# Patient Record
Sex: MALE | Race: WHITE | NOT HISPANIC OR LATINO | Employment: FULL TIME | URBAN - METROPOLITAN AREA
[De-identification: names, ages, dates, MRNs, and addresses within clinical notes are randomized per-mention and may not be internally consistent; named-entity substitution may affect disease eponyms.]

---

## 2023-12-26 DIAGNOSIS — E78.5 HYPERLIPIDEMIA, UNSPECIFIED HYPERLIPIDEMIA TYPE: Primary | ICD-10-CM

## 2023-12-26 RX ORDER — ATORVASTATIN CALCIUM 40 MG/1
40 TABLET, FILM COATED ORAL DAILY
Qty: 90 TABLET | Refills: 3 | Status: SHIPPED | OUTPATIENT
Start: 2023-12-26 | End: 2024-02-13 | Stop reason: SDUPTHER

## 2024-02-13 ENCOUNTER — LAB (OUTPATIENT)
Dept: LAB | Facility: LAB | Age: 34
End: 2024-02-13
Payer: COMMERCIAL

## 2024-02-13 ENCOUNTER — OFFICE VISIT (OUTPATIENT)
Dept: PRIMARY CARE | Facility: CLINIC | Age: 34
End: 2024-02-13
Payer: COMMERCIAL

## 2024-02-13 VITALS
HEART RATE: 57 BPM | HEIGHT: 72 IN | BODY MASS INDEX: 34.4 KG/M2 | RESPIRATION RATE: 16 BRPM | OXYGEN SATURATION: 98 % | SYSTOLIC BLOOD PRESSURE: 140 MMHG | WEIGHT: 254 LBS | DIASTOLIC BLOOD PRESSURE: 90 MMHG

## 2024-02-13 DIAGNOSIS — E78.5 HYPERLIPIDEMIA, UNSPECIFIED HYPERLIPIDEMIA TYPE: ICD-10-CM

## 2024-02-13 DIAGNOSIS — E55.9 VITAMIN D DEFICIENCY: ICD-10-CM

## 2024-02-13 DIAGNOSIS — Z98.890 H/O AORTIC VALVULOPLASTY: ICD-10-CM

## 2024-02-13 DIAGNOSIS — R73.01 ABNORMAL FASTING GLUCOSE: ICD-10-CM

## 2024-02-13 DIAGNOSIS — I10 ESSENTIAL (PRIMARY) HYPERTENSION: ICD-10-CM

## 2024-02-13 DIAGNOSIS — Z76.89 ENCOUNTER TO ESTABLISH CARE: Primary | ICD-10-CM

## 2024-02-13 DIAGNOSIS — Q23.1 BICUSPID AORTIC VALVE (HHS-HCC): ICD-10-CM

## 2024-02-13 DIAGNOSIS — R01.1 HEART MURMUR: ICD-10-CM

## 2024-02-13 DIAGNOSIS — K62.5 RECTAL BLEEDING: ICD-10-CM

## 2024-02-13 DIAGNOSIS — Q23.1 CONGENITAL INSUFFICIENCY OF AORTIC VALVE (HHS-HCC): ICD-10-CM

## 2024-02-13 LAB
25(OH)D3 SERPL-MCNC: 34 NG/ML (ref 30–100)
ALBUMIN SERPL BCP-MCNC: 4.8 G/DL (ref 3.4–5)
ALP SERPL-CCNC: 75 U/L (ref 33–120)
ALT SERPL W P-5'-P-CCNC: 46 U/L (ref 10–52)
ANION GAP SERPL CALC-SCNC: 14 MMOL/L (ref 10–20)
APPEARANCE UR: CLEAR
AST SERPL W P-5'-P-CCNC: 27 U/L (ref 9–39)
BASOPHILS # BLD AUTO: 0.05 X10*3/UL (ref 0–0.1)
BASOPHILS NFR BLD AUTO: 0.7 %
BILIRUB SERPL-MCNC: 0.6 MG/DL (ref 0–1.2)
BILIRUB UR STRIP.AUTO-MCNC: NEGATIVE MG/DL
BUN SERPL-MCNC: 10 MG/DL (ref 6–23)
CALCIUM SERPL-MCNC: 10 MG/DL (ref 8.6–10.6)
CHLORIDE SERPL-SCNC: 102 MMOL/L (ref 98–107)
CHOLEST SERPL-MCNC: 158 MG/DL (ref 0–199)
CHOLESTEROL/HDL RATIO: 4.1
CO2 SERPL-SCNC: 28 MMOL/L (ref 21–32)
COLOR UR: COLORLESS
CREAT SERPL-MCNC: 0.84 MG/DL (ref 0.5–1.3)
EGFRCR SERPLBLD CKD-EPI 2021: >90 ML/MIN/1.73M*2
EOSINOPHIL # BLD AUTO: 0.35 X10*3/UL (ref 0–0.7)
EOSINOPHIL NFR BLD AUTO: 5 %
ERYTHROCYTE [DISTWIDTH] IN BLOOD BY AUTOMATED COUNT: 12.3 % (ref 11.5–14.5)
EST. AVERAGE GLUCOSE BLD GHB EST-MCNC: 97 MG/DL
GLUCOSE SERPL-MCNC: 99 MG/DL (ref 74–99)
GLUCOSE UR STRIP.AUTO-MCNC: NORMAL MG/DL
HBA1C MFR BLD: 5 %
HCT VFR BLD AUTO: 44.7 % (ref 41–52)
HDLC SERPL-MCNC: 38.7 MG/DL
HGB BLD-MCNC: 15.5 G/DL (ref 13.5–17.5)
IMM GRANULOCYTES # BLD AUTO: 0.03 X10*3/UL (ref 0–0.7)
IMM GRANULOCYTES NFR BLD AUTO: 0.4 % (ref 0–0.9)
KETONES UR STRIP.AUTO-MCNC: NEGATIVE MG/DL
LDLC SERPL CALC-MCNC: 64 MG/DL
LEUKOCYTE ESTERASE UR QL STRIP.AUTO: NEGATIVE
LYMPHOCYTES # BLD AUTO: 2.08 X10*3/UL (ref 1.2–4.8)
LYMPHOCYTES NFR BLD AUTO: 29.4 %
MCH RBC QN AUTO: 29.4 PG (ref 26–34)
MCHC RBC AUTO-ENTMCNC: 34.7 G/DL (ref 32–36)
MCV RBC AUTO: 85 FL (ref 80–100)
MONOCYTES # BLD AUTO: 0.45 X10*3/UL (ref 0.1–1)
MONOCYTES NFR BLD AUTO: 6.4 %
NEUTROPHILS # BLD AUTO: 4.11 X10*3/UL (ref 1.2–7.7)
NEUTROPHILS NFR BLD AUTO: 58.1 %
NITRITE UR QL STRIP.AUTO: NEGATIVE
NON HDL CHOLESTEROL: 119 MG/DL (ref 0–149)
NRBC BLD-RTO: 0 /100 WBCS (ref 0–0)
PH UR STRIP.AUTO: 7 [PH]
PLATELET # BLD AUTO: 243 X10*3/UL (ref 150–450)
POTASSIUM SERPL-SCNC: 4.6 MMOL/L (ref 3.5–5.3)
PROT SERPL-MCNC: 7.7 G/DL (ref 6.4–8.2)
PROT UR STRIP.AUTO-MCNC: NEGATIVE MG/DL
RBC # BLD AUTO: 5.27 X10*6/UL (ref 4.5–5.9)
RBC # UR STRIP.AUTO: NEGATIVE /UL
SODIUM SERPL-SCNC: 139 MMOL/L (ref 136–145)
SP GR UR STRIP.AUTO: 1
TRIGL SERPL-MCNC: 275 MG/DL (ref 0–149)
TSH SERPL-ACNC: 2.13 MIU/L (ref 0.44–3.98)
UROBILINOGEN UR STRIP.AUTO-MCNC: NORMAL MG/DL
VLDL: 55 MG/DL (ref 0–40)
WBC # BLD AUTO: 7.1 X10*3/UL (ref 4.4–11.3)

## 2024-02-13 PROCEDURE — 3080F DIAST BP >= 90 MM HG: CPT | Performed by: STUDENT IN AN ORGANIZED HEALTH CARE EDUCATION/TRAINING PROGRAM

## 2024-02-13 PROCEDURE — 85025 COMPLETE CBC W/AUTO DIFF WBC: CPT

## 2024-02-13 PROCEDURE — 84443 ASSAY THYROID STIM HORMONE: CPT

## 2024-02-13 PROCEDURE — 83036 HEMOGLOBIN GLYCOSYLATED A1C: CPT

## 2024-02-13 PROCEDURE — 1036F TOBACCO NON-USER: CPT | Performed by: STUDENT IN AN ORGANIZED HEALTH CARE EDUCATION/TRAINING PROGRAM

## 2024-02-13 PROCEDURE — 80053 COMPREHEN METABOLIC PANEL: CPT

## 2024-02-13 PROCEDURE — 82306 VITAMIN D 25 HYDROXY: CPT

## 2024-02-13 PROCEDURE — 36415 COLL VENOUS BLD VENIPUNCTURE: CPT

## 2024-02-13 PROCEDURE — 80061 LIPID PANEL: CPT

## 2024-02-13 PROCEDURE — 3077F SYST BP >= 140 MM HG: CPT | Performed by: STUDENT IN AN ORGANIZED HEALTH CARE EDUCATION/TRAINING PROGRAM

## 2024-02-13 PROCEDURE — 99214 OFFICE O/P EST MOD 30 MIN: CPT | Performed by: STUDENT IN AN ORGANIZED HEALTH CARE EDUCATION/TRAINING PROGRAM

## 2024-02-13 PROCEDURE — 81003 URINALYSIS AUTO W/O SCOPE: CPT

## 2024-02-13 RX ORDER — ATORVASTATIN CALCIUM 40 MG/1
40 TABLET, FILM COATED ORAL DAILY
Qty: 90 TABLET | Refills: 1 | Status: SHIPPED | OUTPATIENT
Start: 2024-02-13

## 2024-02-13 RX ORDER — VALSARTAN 40 MG/1
1 TABLET ORAL DAILY
COMMUNITY
Start: 2021-06-01 | End: 2024-02-15

## 2024-02-13 RX ORDER — ASPIRIN 81 MG/1
1 TABLET ORAL DAILY
COMMUNITY

## 2024-02-13 RX ORDER — NEBIVOLOL 5 MG/1
1 TABLET ORAL DAILY
COMMUNITY
Start: 2021-02-23 | End: 2024-04-04

## 2024-02-13 RX ORDER — NIFEDIPINE 90 MG/1
1 TABLET, EXTENDED RELEASE ORAL DAILY
COMMUNITY
Start: 2023-05-30 | End: 2024-04-04

## 2024-02-13 RX ORDER — ACETAMINOPHEN 500 MG
TABLET ORAL
COMMUNITY

## 2024-02-13 ASSESSMENT — PATIENT HEALTH QUESTIONNAIRE - PHQ9
SUM OF ALL RESPONSES TO PHQ9 QUESTIONS 1 AND 2: 0
2. FEELING DOWN, DEPRESSED OR HOPELESS: NOT AT ALL
1. LITTLE INTEREST OR PLEASURE IN DOING THINGS: NOT AT ALL

## 2024-02-13 ASSESSMENT — ENCOUNTER SYMPTOMS
OCCASIONAL FEELINGS OF UNSTEADINESS: 0
LIGHT-HEADEDNESS: 0
NAUSEA: 0
ABDOMINAL PAIN: 0
FEVER: 0
DIARRHEA: 0
DEPRESSION: 0
FATIGUE: 0
CONSTIPATION: 0
LOSS OF SENSATION IN FEET: 0
FREQUENCY: 0
RHINORRHEA: 0
MYALGIAS: 0
VOMITING: 0
COUGH: 0
HEADACHES: 0
ARTHRALGIAS: 0
DIZZINESS: 0
SHORTNESS OF BREATH: 0
CHILLS: 0

## 2024-02-13 NOTE — ASSESSMENT & PLAN NOTE
Discovered in 2015 with heart murmur. Was seen by cardiology and found to be stable on echocardiogram. Repeated a year later and found to be grade 4 regurgitation. Aortic valvuloplasty performed in 2017.

## 2024-02-13 NOTE — ASSESSMENT & PLAN NOTE
Discovered in 2015 with heart murmur. Was seen by cardiology and found to be stable on echocardiogram. Repeated a year later and found to be grade 4 regurgitation. Aortic valvuloplasty performed in 2017.   aerobic capacity/endurance/gait, locomotion, and balance/muscle strength

## 2024-02-13 NOTE — ASSESSMENT & PLAN NOTE
Chronic. Stable. Managed by cardiology. Well controlled on valsartan and nebivolol and nifedipine.

## 2024-02-13 NOTE — PROGRESS NOTES
Subjective   Patient ID: Chato Ledesma is a 33 y.o. male who presents for new patient to establish care (Requesting blood work ).    HPI     He is here to establish care.  He had last been seen about a year ago with labs.  His last PCP had retired. He was at [facility].  He does need an order for lab work.  He does need a refill of his atorvastatin    He does follow regularly with Cardiology once yearly.  He follows with Dr. Mayberry.  He gets yearly echocardiograms through their office.  He gets his nifedipine, nebivolol and the valsartan from his cardiologist.    Had a colonoscopy about 2019 for having some episodes of rectal bleeding. They ended up discovering hemorrhoids.    He has had about 5-10 episodes over the last 5 years that starts with some visual symptoms. This then progresses to more of an aura.  He had been seeing his optometrist.     Review of Systems   Constitutional:  Negative for chills, fatigue and fever.   HENT:  Negative for congestion, postnasal drip and rhinorrhea.    Respiratory:  Negative for cough and shortness of breath.    Cardiovascular:  Negative for chest pain.   Gastrointestinal:  Negative for abdominal pain, constipation, diarrhea, nausea and vomiting.   Genitourinary:  Negative for frequency.   Musculoskeletal:  Negative for arthralgias and myalgias.   Neurological:  Negative for dizziness, light-headedness and headaches.       Objective   /90   Pulse 57   Resp 16   Ht 1.829 m (6')   Wt 115 kg (254 lb)   SpO2 98%   BMI 34.45 kg/m²     Physical Exam  Vitals and nursing note reviewed.   Constitutional:       General: He is not in acute distress.     Appearance: Normal appearance. He is normal weight. He is not ill-appearing or toxic-appearing.   HENT:      Head: Normocephalic and atraumatic.   Cardiovascular:      Rate and Rhythm: Normal rate and regular rhythm.      Heart sounds: Normal heart sounds.   Pulmonary:      Effort: Pulmonary effort is normal.       Breath sounds: Normal breath sounds.   Abdominal:      General: Bowel sounds are normal.      Palpations: Abdomen is soft.   Neurological:      Mental Status: He is alert.         Assessment/Plan   Problem List Items Addressed This Visit             ICD-10-CM    Congenital insufficiency of aortic valve Q23.1     Discovered in 2015 with heart murmur. Was seen by cardiology and found to be stable on echocardiogram. Repeated a year later and found to be grade 4 regurgitation. Aortic valvuloplasty performed in 2017.         Relevant Medications    NIFEdipine ER (NIFEdipine CC) 90 mg 24 hr tablet    nebivolol (Bystolic) 5 mg tablet    H/O aortic valvuloplasty Z98.890     Performed April 20th 2017. Follows with cardiology with  with Dr. Mayberry.         Bicuspid aortic valve Q23.1     Discovered in 2015 with heart murmur. Was seen by cardiology and found to be stable on echocardiogram. Repeated a year later and found to be grade 4 regurgitation. Aortic valvuloplasty performed in 2017.         Relevant Medications    NIFEdipine ER (NIFEdipine CC) 90 mg 24 hr tablet    nebivolol (Bystolic) 5 mg tablet    Essential (primary) hypertension I10     Chronic. Stable. Managed by cardiology. Well controlled on valsartan and nebivolol and nifedipine.         Relevant Orders    CBC and Auto Differential    Lipid Panel    Comprehensive Metabolic Panel    TSH with reflex to Free T4 if abnormal    Urinalysis with Reflex Microscopic    Vitamin D deficiency E55.9     Chronic. Stable. Well controlled on daily supplementation.         Relevant Orders    Vitamin D 25-Hydroxy,Total (for eval of Vitamin D levels)    Hyperlipidemia E78.5     Chronic. Stable. Well controlled on the atorvastatin.         Relevant Medications    atorvastatin (Lipitor) 40 mg tablet    Other Relevant Orders    CBC and Auto Differential    Lipid Panel    Comprehensive Metabolic Panel    TSH with reflex to Free T4 if abnormal    Heart murmur R01.1     Discovered in  2015 with heart murmur. Was seen by cardiology and found to be stable on echocardiogram. Repeated a year later and found to be grade 4 regurgitation. Aortic valvuloplasty performed in 2017.         Rectal bleeding K62.5     Occasionally about 1-2 times a month. Unchanging. Previous colonoscopy revealed hemorrhoids.         Abnormal fasting glucose R73.01    Relevant Orders    Hemoglobin A1C     Other Visit Diagnoses         Codes    Encounter to establish care    -  Primary Z76.89          History and physical examination as above.  Patient presenting to establish care.  Lab work orders placed for the patient to be done fasting as he does not recall the last time he has had this performed.  We will contact patient with the results and review in more detail at next appointment for wellness examination in the next couple of weeks.  Updated patient's medical history in the chart.  Recommended continuing to follow-up with his cardiologist.  Recommended to keep good track of blood pressures at home.  He will discuss with his cardiologist if it remains elevated to see if they want to make any adjustments to his medications.  Patient will come in sooner for other acute concerns or complaints.

## 2024-02-13 NOTE — PATIENT INSTRUCTIONS
Thank you for coming in to establish care with me today.    I went ahead and placed lab work orders for you. You can go downstairs today since you are fasting and get this performed.  If there is something more urgent on the lab work, we  we will give you a call first.  Otherwise we can follow-up in more detail at your next appointment for a wellness examination.  We can plan for wellness examination in the next couple of weeks to keep the appointment close note the lab work.  I went ahead and updated most of your medical history in the chart.  We can follow-up on new problems as they arise.  Please continue with regular follow-up with your cardiologist.  I do recommend keeping track of your blood pressures at least a couple times a week upcoming prior to your appointment.  It might be beneficial to have some of the medications adjusted.  We can always discuss this in the future if you would like.  I did send in a refill of the atorvastatin to your pharmacy.  Please call if you are having any issues with getting the medication delivered through the mail order.    Please call with any additional questions or concerns.    Thank you

## 2024-02-15 DIAGNOSIS — I10 ESSENTIAL (PRIMARY) HYPERTENSION: Primary | ICD-10-CM

## 2024-02-15 RX ORDER — VALSARTAN 40 MG/1
40 TABLET ORAL DAILY
Qty: 90 TABLET | Refills: 0 | Status: SHIPPED | OUTPATIENT
Start: 2024-02-15 | End: 2024-04-24 | Stop reason: WASHOUT

## 2024-03-05 ENCOUNTER — APPOINTMENT (OUTPATIENT)
Dept: PRIMARY CARE | Facility: CLINIC | Age: 34
End: 2024-03-05
Payer: COMMERCIAL

## 2024-03-26 ENCOUNTER — APPOINTMENT (OUTPATIENT)
Dept: CARDIOLOGY | Facility: CLINIC | Age: 34
End: 2024-03-26
Payer: COMMERCIAL

## 2024-04-03 DIAGNOSIS — I10 ESSENTIAL (PRIMARY) HYPERTENSION: Primary | ICD-10-CM

## 2024-04-04 RX ORDER — NEBIVOLOL 5 MG/1
5 TABLET ORAL DAILY
Qty: 90 TABLET | Refills: 0 | Status: SHIPPED | OUTPATIENT
Start: 2024-04-04

## 2024-04-04 RX ORDER — NIFEDIPINE 90 MG/1
90 TABLET, EXTENDED RELEASE ORAL DAILY
Qty: 90 TABLET | Refills: 0 | Status: SHIPPED | OUTPATIENT
Start: 2024-04-04

## 2024-04-16 ENCOUNTER — APPOINTMENT (OUTPATIENT)
Dept: CARDIOLOGY | Facility: CLINIC | Age: 34
End: 2024-04-16
Payer: COMMERCIAL

## 2024-04-24 ENCOUNTER — OFFICE VISIT (OUTPATIENT)
Dept: CARDIOLOGY | Facility: CLINIC | Age: 34
End: 2024-04-24
Payer: COMMERCIAL

## 2024-04-24 ENCOUNTER — HOSPITAL ENCOUNTER (OUTPATIENT)
Dept: CARDIOLOGY | Facility: CLINIC | Age: 34
Discharge: HOME | End: 2024-04-24
Payer: COMMERCIAL

## 2024-04-24 VITALS
SYSTOLIC BLOOD PRESSURE: 133 MMHG | WEIGHT: 255 LBS | HEART RATE: 66 BPM | DIASTOLIC BLOOD PRESSURE: 80 MMHG | HEIGHT: 72 IN | TEMPERATURE: 97.6 F | BODY MASS INDEX: 34.54 KG/M2

## 2024-04-24 DIAGNOSIS — Z98.890 STATUS POST AORTIC VALVE REPAIR: Primary | ICD-10-CM

## 2024-04-24 DIAGNOSIS — Q23.1 CONGENITAL INSUFFICIENCY OF AORTIC VALVE (HHS-HCC): ICD-10-CM

## 2024-04-24 DIAGNOSIS — I10 ESSENTIAL (PRIMARY) HYPERTENSION: ICD-10-CM

## 2024-04-24 DIAGNOSIS — E78.2 MIXED HYPERLIPIDEMIA: ICD-10-CM

## 2024-04-24 DIAGNOSIS — Z98.890 OTHER SPECIFIED POSTPROCEDURAL STATES: ICD-10-CM

## 2024-04-24 LAB
AORTIC VALVE MEAN GRADIENT: 16.1 MMHG
AORTIC VALVE PEAK VELOCITY: 2.68 M/S
AV PEAK GRADIENT: 28.8 MMHG
AVA (PEAK VEL): 3 CM2
AVA (VTI): 3.08 CM2
EJECTION FRACTION APICAL 4 CHAMBER: 71.9
LEFT ATRIUM VOLUME AREA LENGTH INDEX BSA: 30.1 ML/M2
LEFT VENTRICLE INTERNAL DIMENSION DIASTOLE: 5.3 CM (ref 3.5–6)
LEFT VENTRICULAR OUTFLOW TRACT DIAMETER: 3.26 CM
LV EJECTION FRACTION BIPLANE: 73 %
MITRAL VALVE E/A RATIO: 1.33
MITRAL VALVE E/E' RATIO: 6.24
RIGHT VENTRICLE FREE WALL PEAK S': 13 CM/S
TRICUSPID ANNULAR PLANE SYSTOLIC EXCURSION: 2.2 CM

## 2024-04-24 PROCEDURE — 3075F SYST BP GE 130 - 139MM HG: CPT | Performed by: INTERNAL MEDICINE

## 2024-04-24 PROCEDURE — 93306 TTE W/DOPPLER COMPLETE: CPT | Performed by: INTERNAL MEDICINE

## 2024-04-24 PROCEDURE — 99214 OFFICE O/P EST MOD 30 MIN: CPT | Performed by: INTERNAL MEDICINE

## 2024-04-24 PROCEDURE — 93306 TTE W/DOPPLER COMPLETE: CPT

## 2024-04-24 PROCEDURE — 1036F TOBACCO NON-USER: CPT | Performed by: INTERNAL MEDICINE

## 2024-04-24 PROCEDURE — 99214 OFFICE O/P EST MOD 30 MIN: CPT | Mod: 25 | Performed by: INTERNAL MEDICINE

## 2024-04-24 PROCEDURE — 3079F DIAST BP 80-89 MM HG: CPT | Performed by: INTERNAL MEDICINE

## 2024-04-24 RX ORDER — VALSARTAN 80 MG/1
80 TABLET ORAL DAILY
Qty: 90 TABLET | Refills: 3 | Status: SHIPPED | OUTPATIENT
Start: 2024-04-24 | End: 2025-04-24

## 2024-04-24 NOTE — PROGRESS NOTES
Chief Complaint:   Valve Disorder     History of Present Illness     Chato Ledesma is a 34 y.o. male presenting with aortic valve regurgitation s/p aortic valve repair.  The patient has been well since their last appointment and has no cardiac complaints.  The patient denies chest pain, shortness of breath, or any systemic symptoms.  The patient is compliant with aspirin and antibiotic prophylaxis to prevent endocarditis.  Their most recent echocardiogram demonstrates normal prosthetic valve function.      Review of Systems  All pertinent systems have been reviewed and are negative except for what is stated in the history of present illness.    All other systems have been reviewed and are negative and noncontributory to this patient's current ailments.  .       Previous History     Past Medical History:  He has a past medical history of Personal history of (corrected) congenital malformations of heart and circulatory system (04/26/2017), Personal history of diseases of the blood and blood-forming organs and certain disorders involving the immune mechanism, Personal history of other diseases of the circulatory system (04/26/2017), Personal history of other diseases of the digestive system (04/26/2017), Personal history of other diseases of the digestive system (04/26/2017), and Status post aortic valve repair (04/24/2024).    Past Surgical History:  He has a past surgical history that includes Other surgical history (04/26/2017).      Social History:  He reports that he has never smoked. He has never been exposed to tobacco smoke. He has never used smokeless tobacco. He reports current alcohol use of about 10.0 - 12.0 standard drinks of alcohol per week. He reports that he does not use drugs.    Family History:  Family History   Problem Relation Name Age of Onset    Hyperlipidemia Mother      Hypertension Mother      Other (bladder cancer) Father          Allergies:  Patient has no known allergies.    Outpatient  Medications:  Current Outpatient Medications   Medication Instructions    aspirin 81 mg EC tablet 1 tablet, oral, Daily    atorvastatin (LIPITOR) 40 mg, oral, Daily    cholecalciferol (Vitamin D-3) 5,000 Units tablet oral    nebivolol (BYSTOLIC) 5 mg, oral, Daily    NIFEdipine ER (ADALAT CC) 90 mg, oral, Daily    valsartan (DIOVAN) 40 mg, oral, Daily       Physical Examination   Vitals:  Visit Vitals  /80 (BP Location: Right arm)   Pulse 66   Temp 36.4 °C (97.6 °F)   Ht 1.829 m (6')   Wt 116 kg (255 lb)   BMI 34.58 kg/m²   Smoking Status Never   BSA 2.43 m²    Physical Exam  Vitals reviewed.   Constitutional:       General: He is not in acute distress.     Appearance: Normal appearance.   HENT:      Head: Normocephalic and atraumatic.      Nose: Nose normal.   Eyes:      Conjunctiva/sclera: Conjunctivae normal.   Cardiovascular:      Rate and Rhythm: Normal rate and regular rhythm.      Pulses: Normal pulses.      Heart sounds: Murmur heard.      Systolic murmur is present with a grade of 2/6.   Pulmonary:      Effort: Pulmonary effort is normal. No respiratory distress.      Breath sounds: Normal breath sounds. No wheezing, rhonchi or rales.   Abdominal:      General: Bowel sounds are normal. There is no distension.      Palpations: Abdomen is soft.      Tenderness: There is no abdominal tenderness.   Musculoskeletal:         General: No swelling.      Right lower leg: No edema.      Left lower leg: No edema.   Skin:     General: Skin is warm and dry.      Capillary Refill: Capillary refill takes less than 2 seconds.   Neurological:      General: No focal deficit present.      Mental Status: He is alert.   Psychiatric:         Mood and Affect: Mood normal.              Labs/Imaging/Cardiac Studies     Last Labs:  CBC -  Lab Results   Component Value Date    WBC 7.1 02/13/2024    HGB 15.5 02/13/2024    HCT 44.7 02/13/2024    MCV 85 02/13/2024     02/13/2024       CMP -  Lab Results   Component Value  Date    CALCIUM 10.0 02/13/2024    PROT 7.7 02/13/2024    ALBUMIN 4.8 02/13/2024    AST 27 02/13/2024    ALT 46 02/13/2024    ALKPHOS 75 02/13/2024    BILITOT 0.6 02/13/2024       LIPID PANEL -   Lab Results   Component Value Date    CHOL 158 02/13/2024    HDL 38.7 02/13/2024    CHHDL 4.1 02/13/2024    VLDL 55 (H) 02/13/2024    TRIG 275 (H) 02/13/2024    NHDL 119 02/13/2024       RENAL FUNCTION PANEL -   Lab Results   Component Value Date    K 4.6 02/13/2024       Lab Results   Component Value Date    HGBA1C 5.0 02/13/2024       ECG:    Echo:  No echocardiogram results found for the past 12 months       Assessment and Recommendations     Assessment/Plan     1. Status post aortic valve repair  Stable and asymptomatic s/p AV repair with mild stenois.  Echocardiogram today shows normal LV function and trivial AI with mild stenosis.  Will continue treatment with ASA 81 mg every day and lifelong SBE antibiotic prophylaxis.     - Transthoracic echo (TTE) complete; Future  - Follow Up In Cardiology; Future    2. Mixed hyperlipidemia  The patient's lipids are well controlled on chronic statin therapy and they are meeting their goal LDL cholesterol per the ACC/AHA guidelines.      - Transthoracic echo (TTE) complete; Future  - Follow Up In Cardiology; Future    3. Essential (primary) hypertension  Increase Valsartan for recently elevated BP  - Transthoracic echo (TTE) complete; Future  - Follow Up In Cardiology; Future     Echo and OV 1 year    Andrés Mayberry MD    Exclusive of any other services or procedures performed, I, Andrés Mayberry MD , spent 30 minutes in duration for this visit today.  This time consisted of chart review, obtaining history, and/or performing the exam as documented above as well as documenting the clinical information for the encounter in the electronic record, discussing treatment options, plans, and/or goals with patient, family, and/or caregiver, refilling medications, updating the electronic  record, ordering medicines, lab work, imaging, referrals, and/or procedures as documented above and communicating with other [facility] professionals. I have discussed the results of laboratory, radiology, and cardiology studies with the patient and their family/caregiver.

## 2024-06-27 DIAGNOSIS — I10 ESSENTIAL (PRIMARY) HYPERTENSION: ICD-10-CM

## 2024-07-01 DIAGNOSIS — E78.5 HYPERLIPIDEMIA, UNSPECIFIED HYPERLIPIDEMIA TYPE: ICD-10-CM

## 2024-07-01 RX ORDER — NEBIVOLOL 5 MG/1
5 TABLET ORAL DAILY
Qty: 90 TABLET | Refills: 2 | Status: SHIPPED | OUTPATIENT
Start: 2024-07-01

## 2024-07-01 RX ORDER — NIFEDIPINE 90 MG/1
90 TABLET, EXTENDED RELEASE ORAL DAILY
Qty: 90 TABLET | Refills: 2 | Status: SHIPPED | OUTPATIENT
Start: 2024-07-01

## 2024-07-03 RX ORDER — ATORVASTATIN CALCIUM 40 MG/1
40 TABLET, FILM COATED ORAL DAILY
Qty: 90 TABLET | Refills: 1 | Status: SHIPPED | OUTPATIENT
Start: 2024-07-03

## 2024-10-29 ENCOUNTER — APPOINTMENT (OUTPATIENT)
Dept: PRIMARY CARE | Facility: CLINIC | Age: 34
End: 2024-10-29
Payer: COMMERCIAL

## 2024-10-29 VITALS
BODY MASS INDEX: 33.63 KG/M2 | WEIGHT: 248 LBS | OXYGEN SATURATION: 98 % | SYSTOLIC BLOOD PRESSURE: 142 MMHG | TEMPERATURE: 97.1 F | HEART RATE: 51 BPM | DIASTOLIC BLOOD PRESSURE: 86 MMHG

## 2024-10-29 DIAGNOSIS — Z98.890 H/O AORTIC VALVULOPLASTY: ICD-10-CM

## 2024-10-29 DIAGNOSIS — Z80.52 FAMILY HISTORY OF BLADDER CANCER: ICD-10-CM

## 2024-10-29 DIAGNOSIS — Q23.81 BICUSPID AORTIC VALVE: ICD-10-CM

## 2024-10-29 DIAGNOSIS — Z98.890 STATUS POST AORTIC VALVE REPAIR: ICD-10-CM

## 2024-10-29 DIAGNOSIS — E78.2 MIXED HYPERLIPIDEMIA: ICD-10-CM

## 2024-10-29 DIAGNOSIS — R73.01 ELEVATED FASTING GLUCOSE: ICD-10-CM

## 2024-10-29 DIAGNOSIS — Q23.1 CONGENITAL INSUFFICIENCY OF AORTIC VALVE (HHS-HCC): ICD-10-CM

## 2024-10-29 DIAGNOSIS — I10 ESSENTIAL (PRIMARY) HYPERTENSION: ICD-10-CM

## 2024-10-29 DIAGNOSIS — Z00.00 ENCOUNTER FOR ADULT WELLNESS VISIT: Primary | ICD-10-CM

## 2024-10-29 DIAGNOSIS — L98.9 CHANGING SKIN LESION: ICD-10-CM

## 2024-10-29 PROBLEM — E55.9 VITAMIN D DEFICIENCY: Status: RESOLVED | Noted: 2024-02-13 | Resolved: 2024-10-29

## 2024-10-29 PROCEDURE — 3077F SYST BP >= 140 MM HG: CPT | Performed by: STUDENT IN AN ORGANIZED HEALTH CARE EDUCATION/TRAINING PROGRAM

## 2024-10-29 PROCEDURE — 99395 PREV VISIT EST AGE 18-39: CPT | Performed by: STUDENT IN AN ORGANIZED HEALTH CARE EDUCATION/TRAINING PROGRAM

## 2024-10-29 PROCEDURE — 3079F DIAST BP 80-89 MM HG: CPT | Performed by: STUDENT IN AN ORGANIZED HEALTH CARE EDUCATION/TRAINING PROGRAM

## 2024-10-29 ASSESSMENT — PATIENT HEALTH QUESTIONNAIRE - PHQ9
1. LITTLE INTEREST OR PLEASURE IN DOING THINGS: NOT AT ALL
2. FEELING DOWN, DEPRESSED OR HOPELESS: NOT AT ALL
SUM OF ALL RESPONSES TO PHQ9 QUESTIONS 1 AND 2: 0

## 2024-10-29 ASSESSMENT — ENCOUNTER SYMPTOMS
ARTHRALGIAS: 0
LIGHT-HEADEDNESS: 0
COUGH: 0
SORE THROAT: 0
NUMBNESS: 0
NAUSEA: 0
ABDOMINAL PAIN: 0
FEVER: 0
RHINORRHEA: 0
FATIGUE: 0
DIZZINESS: 0
NERVOUS/ANXIOUS: 0
DYSURIA: 0
FREQUENCY: 0
COLOR CHANGE: 0
VOMITING: 0
MYALGIAS: 0
CHILLS: 0
SHORTNESS OF BREATH: 0
DYSPHORIC MOOD: 0
PALPITATIONS: 0
CONSTIPATION: 0
DIARRHEA: 0

## 2025-01-15 DIAGNOSIS — E78.5 HYPERLIPIDEMIA, UNSPECIFIED HYPERLIPIDEMIA TYPE: ICD-10-CM

## 2025-01-17 RX ORDER — ATORVASTATIN CALCIUM 40 MG/1
40 TABLET, FILM COATED ORAL DAILY
Qty: 90 TABLET | Refills: 1 | Status: SHIPPED | OUTPATIENT
Start: 2025-01-17

## 2025-02-15 DIAGNOSIS — I10 ESSENTIAL (PRIMARY) HYPERTENSION: ICD-10-CM

## 2025-02-17 RX ORDER — NIFEDIPINE 90 MG/1
90 TABLET, EXTENDED RELEASE ORAL DAILY
Qty: 90 TABLET | Refills: 0 | Status: SHIPPED | OUTPATIENT
Start: 2025-02-17

## 2025-02-17 RX ORDER — NEBIVOLOL 5 MG/1
5 TABLET ORAL DAILY
Qty: 90 TABLET | Refills: 0 | Status: SHIPPED | OUTPATIENT
Start: 2025-02-17

## 2025-02-26 ENCOUNTER — APPOINTMENT (OUTPATIENT)
Dept: DERMATOLOGY | Facility: CLINIC | Age: 35
End: 2025-02-26
Payer: COMMERCIAL

## 2025-02-26 DIAGNOSIS — L82.1 SEBORRHEIC KERATOSIS: Primary | ICD-10-CM

## 2025-02-26 DIAGNOSIS — Z12.83 ENCOUNTER FOR SCREENING FOR MALIGNANT NEOPLASM OF SKIN: ICD-10-CM

## 2025-02-26 PROCEDURE — 99203 OFFICE O/P NEW LOW 30 MIN: CPT | Performed by: NURSE PRACTITIONER

## 2025-02-26 PROCEDURE — 1036F TOBACCO NON-USER: CPT | Performed by: NURSE PRACTITIONER

## 2025-02-26 RX ORDER — AMOXICILLIN 500 MG/1
CAPSULE ORAL
COMMUNITY
Start: 2025-02-24

## 2025-02-26 NOTE — LETTER
February 26, 2025     Wallace Vasquez DO  [address]  [facility]  [place] [address]    Patient: Chato Ledesma   YOB: 1990   Date of Visit: 2/26/2025       Dear Dr. Wallace Vasquez DO:    Thank you for referring Chato Ledesma to me for evaluation. Below are my notes for this consultation.  If you have questions, please do not hesitate to call me. I look forward to following your patient along with you.       Sincerely,     Susan L Mayne, APRN-CNP      CC: No Recipients  ______________________________________________________________________________________    Subjective    Chato Ledesma is a 34 y.o. male who presents for the following: Suspicious Skin Lesion (Pt presents to office for evaluation of lesion on the abdomen.  Pt states he thinks lesion has grown over the past year. No personal or family history of skin cancer noted. ).     Review of Systems:  No other skin or systemic complaints other than what is documented elsewhere in the note.    The following portions of the chart were reviewed this encounter and updated as appropriate:       Skin Cancer History  No skin cancer on file.    Specialty Problems    None    Past Medical History:  Chato Ledesma  has a past medical history of Personal history of (corrected) congenital malformations of heart and circulatory system (04/26/2017), Personal history of diseases of the blood and blood-forming organs and certain disorders involving the immune mechanism, Personal history of other diseases of the circulatory system (04/26/2017), Personal history of other diseases of the digestive system (04/26/2017), Personal history of other diseases of the digestive system (04/26/2017), Status post aortic valve repair (04/24/2024), and Vitamin D deficiency (02/13/2024).    Past Surgical History:  Chato Ledesma  has a past surgical history that includes Other surgical history (04/26/2017).    Family History:  Patient family history  includes Hyperlipidemia in his mother; Hypertension in his mother; bladder cancer in his father.    Social History:  Chato Ledesma  reports that he has never smoked. He has never been exposed to tobacco smoke. He has never used smokeless tobacco. He reports current alcohol use of about 10.0 - 12.0 standard drinks of alcohol per week. He reports that he does not use drugs.    Allergies:  Patient has no known allergies.    Current Medications / CAM's:    Current Outpatient Medications:   •  amoxicillin (Amoxil) 500 mg capsule, , Disp: , Rfl:   •  aspirin 81 mg EC tablet, Take 1 tablet (81 mg) by mouth once daily., Disp: , Rfl:   •  atorvastatin (Lipitor) 40 mg tablet, TAKE 1 TABLET BY MOUTH ONCE  DAILY, Disp: 90 tablet, Rfl: 1  •  cholecalciferol (Vitamin D-3) 5,000 Units tablet, Take by mouth., Disp: , Rfl:   •  nebivolol (Bystolic) 5 mg tablet, TAKE 1 TABLET BY MOUTH DAILY, Disp: 90 tablet, Rfl: 0  •  NIFEdipine XL 90 mg 24 hr tablet, TAKE 1 TABLET BY MOUTH DAILY, Disp: 90 tablet, Rfl: 0  •  valsartan (Diovan) 80 mg tablet, Take 1 tablet (80 mg) by mouth once daily., Disp: 90 tablet, Rfl: 3     Objective  Well appearing patient in no apparent distress; mood and affect are within normal limits.    A focused skin examination was performed. All findings within normal limits unless otherwise noted below.    Assessment/Plan  1. Seborrheic keratosis  Left Abdomen (side) - Upper  Stuck on verrucous, tan-brown papule    Although Seborrheic Keratoses can be troublesome and unsightly, they are entirely benign.  Removal of Seborrheic Keratoses is considered a cosmetic procedure. Removal is typically performed using liquid nitrogen cryotherapy.  Treatment of current lesions does not prevent the development of new Seborrheic Keratoses in the future.    2. Encounter for screening for malignant neoplasm of skin  neck, face, abdomen  Scattered benign lesions    - Protective measures, such as avoiding skin exposure to sunlight  during peak sun hours (10 AM to 3 PM), wearing protective clothing, and applying high-SPF sunscreen, are essential for reducing exposure to harmful ultraviolet (UV) light.  - Monthly self-examination of the skin is helpful to detect new lesions or changes in existing lesions.  - Discussed signs and symptoms of sun-related skin cancers.   - Make sure your moles are not signs of skin cancer (melanoma). Remember the ABCDEs of melanoma lesions:  A - Asymmetry: One half of the lesion does not mirror the other half.  B - Border: The borders are irregular or vague (indistinct).  C - Color: More than one color may be noted within the mole.  D - Diameter: Size greater than 6 mm (roughly the size of a pencil eraser) may be concerning.  E - Evolving: Notable changes in the lesion over time are suspicious signs for skin cancer.    Please call me if there are any changes or development of concerning symptoms (lesion/skin condition is changing, bleeding, enlarging, or worsening).

## 2025-02-26 NOTE — PROGRESS NOTES
Subjective     Chato Ledesma is a 34 y.o. male who presents for the following: Suspicious Skin Lesion (Pt presents to office for evaluation of lesion on the abdomen.  Pt states he thinks lesion has grown over the past year. No personal or family history of skin cancer noted. ).     Review of Systems:  No other skin or systemic complaints other than what is documented elsewhere in the note.    The following portions of the chart were reviewed this encounter and updated as appropriate:       Skin Cancer History  No skin cancer on file.    Specialty Problems    None    Past Medical History:  Chato Ledesma  has a past medical history of Personal history of (corrected) congenital malformations of heart and circulatory system (04/26/2017), Personal history of diseases of the blood and blood-forming organs and certain disorders involving the immune mechanism, Personal history of other diseases of the circulatory system (04/26/2017), Personal history of other diseases of the digestive system (04/26/2017), Personal history of other diseases of the digestive system (04/26/2017), Status post aortic valve repair (04/24/2024), and Vitamin D deficiency (02/13/2024).    Past Surgical History:  Chato Ledesma  has a past surgical history that includes Other surgical history (04/26/2017).    Family History:  Patient family history includes Hyperlipidemia in his mother; Hypertension in his mother; bladder cancer in his father.    Social History:  Chato Ledesma  reports that he has never smoked. He has never been exposed to tobacco smoke. He has never used smokeless tobacco. He reports current alcohol use of about 10.0 - 12.0 standard drinks of alcohol per week. He reports that he does not use drugs.    Allergies:  Patient has no known allergies.    Current Medications / CAM's:    Current Outpatient Medications:     amoxicillin (Amoxil) 500 mg capsule, , Disp: , Rfl:     aspirin 81 mg EC tablet, Take 1 tablet (81 mg) by mouth once  daily., Disp: , Rfl:     atorvastatin (Lipitor) 40 mg tablet, TAKE 1 TABLET BY MOUTH ONCE  DAILY, Disp: 90 tablet, Rfl: 1    cholecalciferol (Vitamin D-3) 5,000 Units tablet, Take by mouth., Disp: , Rfl:     nebivolol (Bystolic) 5 mg tablet, TAKE 1 TABLET BY MOUTH DAILY, Disp: 90 tablet, Rfl: 0    NIFEdipine XL 90 mg 24 hr tablet, TAKE 1 TABLET BY MOUTH DAILY, Disp: 90 tablet, Rfl: 0    valsartan (Diovan) 80 mg tablet, Take 1 tablet (80 mg) by mouth once daily., Disp: 90 tablet, Rfl: 3     Objective   Well appearing patient in no apparent distress; mood and affect are within normal limits.    A focused skin examination was performed. All findings within normal limits unless otherwise noted below.    Assessment/Plan   1. Seborrheic keratosis  Left Abdomen (side) - Upper  Stuck on verrucous, tan-brown papule    Although Seborrheic Keratoses can be troublesome and unsightly, they are entirely benign.  Removal of Seborrheic Keratoses is considered a cosmetic procedure. Removal is typically performed using liquid nitrogen cryotherapy.  Treatment of current lesions does not prevent the development of new Seborrheic Keratoses in the future.    2. Encounter for screening for malignant neoplasm of skin  neck, face, abdomen  Scattered benign lesions    - Protective measures, such as avoiding skin exposure to sunlight during peak sun hours (10 AM to 3 PM), wearing protective clothing, and applying high-SPF sunscreen, are essential for reducing exposure to harmful ultraviolet (UV) light.  - Monthly self-examination of the skin is helpful to detect new lesions or changes in existing lesions.  - Discussed signs and symptoms of sun-related skin cancers.   - Make sure your moles are not signs of skin cancer (melanoma). Remember the ABCDEs of melanoma lesions:  A - Asymmetry: One half of the lesion does not mirror the other half.  B - Border: The borders are irregular or vague (indistinct).  C - Color: More than one color may be  noted within the mole.  D - Diameter: Size greater than 6 mm (roughly the size of a pencil eraser) may be concerning.  E - Evolving: Notable changes in the lesion over time are suspicious signs for skin cancer.    Please call me if there are any changes or development of concerning symptoms (lesion/skin condition is changing, bleeding, enlarging, or worsening).

## 2025-04-07 DIAGNOSIS — E78.2 MIXED HYPERLIPIDEMIA: ICD-10-CM

## 2025-04-07 DIAGNOSIS — Z98.890 STATUS POST AORTIC VALVE REPAIR: ICD-10-CM

## 2025-04-07 DIAGNOSIS — I10 ESSENTIAL (PRIMARY) HYPERTENSION: ICD-10-CM

## 2025-04-08 RX ORDER — VALSARTAN 80 MG/1
80 TABLET ORAL DAILY
Qty: 90 TABLET | Refills: 0 | Status: SHIPPED | OUTPATIENT
Start: 2025-04-08

## 2025-04-19 DIAGNOSIS — I10 ESSENTIAL (PRIMARY) HYPERTENSION: ICD-10-CM

## 2025-04-21 RX ORDER — NEBIVOLOL 5 MG/1
5 TABLET ORAL DAILY
Qty: 90 TABLET | Refills: 0 | Status: SHIPPED | OUTPATIENT
Start: 2025-04-21

## 2025-04-21 RX ORDER — NIFEDIPINE 90 MG/1
90 TABLET, EXTENDED RELEASE ORAL DAILY
Qty: 90 TABLET | Refills: 0 | Status: SHIPPED | OUTPATIENT
Start: 2025-04-21

## 2025-04-29 ENCOUNTER — OFFICE VISIT (OUTPATIENT)
Dept: CARDIOLOGY | Facility: CLINIC | Age: 35
End: 2025-04-29
Payer: COMMERCIAL

## 2025-04-29 ENCOUNTER — HOSPITAL ENCOUNTER (OUTPATIENT)
Dept: CARDIOLOGY | Facility: CLINIC | Age: 35
Discharge: HOME | End: 2025-04-29
Payer: COMMERCIAL

## 2025-04-29 VITALS
DIASTOLIC BLOOD PRESSURE: 82 MMHG | HEART RATE: 65 BPM | WEIGHT: 250 LBS | HEIGHT: 72 IN | BODY MASS INDEX: 33.86 KG/M2 | SYSTOLIC BLOOD PRESSURE: 153 MMHG

## 2025-04-29 DIAGNOSIS — E78.2 MIXED HYPERLIPIDEMIA: ICD-10-CM

## 2025-04-29 DIAGNOSIS — Z98.890 STATUS POST AORTIC VALVE REPAIR: ICD-10-CM

## 2025-04-29 DIAGNOSIS — I10 ESSENTIAL (PRIMARY) HYPERTENSION: ICD-10-CM

## 2025-04-29 DIAGNOSIS — Q23.0 CONGENITAL STENOSIS OF AORTIC VALVE (HHS-HCC): ICD-10-CM

## 2025-04-29 LAB
AORTIC VALVE MEAN GRADIENT: 12 MMHG
AORTIC VALVE PEAK VELOCITY: 2.45 M/S
AV PEAK GRADIENT: 24 MMHG
AVA (PEAK VEL): 2.82 CM2
AVA (VTI): 3.32 CM2
EJECTION FRACTION APICAL 4 CHAMBER: 74.1
EJECTION FRACTION: 66 %
LEFT ATRIUM VOLUME AREA LENGTH INDEX BSA: 32.1 ML/M2
LEFT VENTRICLE INTERNAL DIMENSION DIASTOLE: 5.68 CM (ref 3.5–6)
LEFT VENTRICULAR OUTFLOW TRACT DIAMETER: 3.15 CM
MITRAL VALVE E/A RATIO: 1.38
RIGHT VENTRICLE FREE WALL PEAK S': 13 CM/S
TRICUSPID ANNULAR PLANE SYSTOLIC EXCURSION: 2.4 CM

## 2025-04-29 PROCEDURE — 93306 TTE W/DOPPLER COMPLETE: CPT | Performed by: INTERNAL MEDICINE

## 2025-04-29 PROCEDURE — 93306 TTE W/DOPPLER COMPLETE: CPT

## 2025-04-29 PROCEDURE — 3008F BODY MASS INDEX DOCD: CPT | Performed by: INTERNAL MEDICINE

## 2025-04-29 PROCEDURE — 3077F SYST BP >= 140 MM HG: CPT | Performed by: INTERNAL MEDICINE

## 2025-04-29 PROCEDURE — 3079F DIAST BP 80-89 MM HG: CPT | Performed by: INTERNAL MEDICINE

## 2025-04-29 PROCEDURE — 99214 OFFICE O/P EST MOD 30 MIN: CPT | Performed by: INTERNAL MEDICINE

## 2025-04-29 PROCEDURE — 99212 OFFICE O/P EST SF 10 MIN: CPT | Mod: 25 | Performed by: INTERNAL MEDICINE

## 2025-04-29 PROCEDURE — 1036F TOBACCO NON-USER: CPT | Performed by: INTERNAL MEDICINE

## 2025-04-29 RX ORDER — VALSARTAN 160 MG/1
160 TABLET ORAL DAILY
Qty: 30 TABLET | Refills: 11 | Status: SHIPPED | OUTPATIENT
Start: 2025-04-29 | End: 2026-04-29

## 2025-04-29 NOTE — PROGRESS NOTES
Chief Complaint:   Valve Disorder     History of Present Illness     Chaot Ledesma is a 35 y.o. male presenting with aortic valve regurgitation due to bicuspid aortic valve s/p aortic valve repair 4/20/17.  The patient has been well since their last appointment and has no cardiac complaints.  The patient denies chest pain, shortness of breath, or any systemic symptoms.  The patient is compliant with aspirin and antibiotic prophylaxis to prevent endocarditis.  Their most recent echocardiogram demonstrates normal mil aortic stenosis and trivial AI.  Exercising.    Review of Systems  All pertinent systems have been reviewed and are negative except for what is stated in the history of present illness.    All other systems have been reviewed and are negative and noncontributory to this patient's current ailments.  .       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    Past Surgical History:  He has a past surgical history that includes Other surgical history (04/26/2017).      Social History:  He reports that he has never smoked. He has never been exposed to tobacco smoke. He has never used smokeless tobacco. He reports current alcohol use of about 10.0 - 12.0 standard drinks of alcohol per week. He reports that he does not use drugs.    Family History:  Family History   Problem Relation Name Age of Onset    Hyperlipidemia Mother      Hypertension Mother      Other  (bladder cancer) Father          Allergies:  Patient has no known allergies.    Outpatient Medications:  Current Outpatient Medications   Medication Instructions    amoxicillin (Amoxil) 500 mg capsule     aspirin 81 mg EC tablet 1 tablet, Daily    atorvastatin (LIPITOR) 40 mg, oral, Daily    cholecalciferol (Vitamin D-3) 5,000 Units tablet Take by mouth.    nebivolol (BYSTOLIC) 5 mg, oral, Daily    NIFEdipine ER (ADALAT CC) 90 mg, oral, Daily    valsartan (DIOVAN) 80 mg, oral, Daily       Physical Examination   Vitals:  Visit Vitals  /82   Pulse 65   Ht 1.829 m (6')   Wt 113 kg (250 lb)   BMI 33.91 kg/m²   Smoking Status Never   BSA 2.4 m²    Physical Exam  Vitals reviewed.   Constitutional:       General: He is not in acute distress.     Appearance: Normal appearance.   HENT:      Head: Normocephalic and atraumatic.      Nose: Nose normal.   Eyes:      Conjunctiva/sclera: Conjunctivae normal.   Cardiovascular:      Rate and Rhythm: Normal rate and regular rhythm.      Pulses: Normal pulses.      Heart sounds: Murmur heard.      Systolic murmur is present with a grade of 1/6.   Pulmonary:      Effort: Pulmonary effort is normal. No respiratory distress.      Breath sounds: Normal breath sounds. No wheezing, rhonchi or rales.   Abdominal:      General: Bowel sounds are normal. There is no distension.      Palpations: Abdomen is soft.      Tenderness: There is no abdominal tenderness.   Musculoskeletal:         General: No swelling.      Right lower leg: No edema.      Left lower leg: No edema.   Skin:     General: Skin is warm and dry.      Capillary Refill: Capillary refill takes less than 2 seconds.   Neurological:      General: No focal deficit present.      Mental Status: He is alert.   Psychiatric:         Mood and Affect: Mood normal.              Labs/Imaging/Cardiac Studies     Last Labs:  CBC -  Lab Results   Component Value Date    WBC 7.1 02/13/2024    HGB 15.5 02/13/2024    HCT 44.7 02/13/2024     MCV 85 02/13/2024     02/13/2024       CMP -  Lab Results   Component Value Date    CALCIUM 10.0 02/13/2024    PROT 7.7 02/13/2024    ALBUMIN 4.8 02/13/2024    AST 27 02/13/2024    ALT 46 02/13/2024    ALKPHOS 75 02/13/2024    BILITOT 0.6 02/13/2024       LIPID PANEL -   Lab Results   Component Value Date    CHOL 158 02/13/2024    HDL 38.7 02/13/2024    CHHDL 4.1 02/13/2024    VLDL 55 (H) 02/13/2024    TRIG 275 (H) 02/13/2024    NHDL 119 02/13/2024       RENAL FUNCTION PANEL -   Lab Results   Component Value Date    K 4.6 02/13/2024       Lab Results   Component Value Date    HGBA1C 5.0 02/13/2024       Reviewed Echo, Labs and PCP notes    Echo:  No echocardiogram results found for the past 12 months       Assessment and Recommendations     Assessment/Plan     1. Status post aortic valve repair  Stable and asymptomatic s/p AV repair with mild stenois.  Echocardiogram today shows normal LV function and trivial AI with mild stenosis.  Will continue treatment with ASA 81 mg every day and lifelong SBE antibiotic prophylaxis.     - Transthoracic echo (TTE) complete; Future  - Follow Up In Cardiology; Future    2. Mixed hyperlipidemia  The patient's lipids are well controlled on chronic atorvastatin therapy and they are meeting their goal LDL cholesterol per the ACC/AHA guidelines.      - Transthoracic echo (TTE) complete; Future  - Follow Up In Cardiology; Future    3. Essential (primary) hypertension  Elevated today and at home.  Increase ARB to 160 mg every day.  Follow BP at home.  - Transthoracic echo (TTE) complete; Future  - Follow Up In Cardiology; Future     Chato Ledesma will return in 1 year for an office visit with Echo.       Andrés Mayberry MD    Exclusive of any other services or procedures performed, I, Andrés Mayberry MD , spent 30 minutes in duration for this visit today.  This time consisted of chart review, obtaining history, and/or performing the exam as documented above as well as documenting the  clinical information for the encounter in the electronic record, discussing treatment options, plans, and/or goals with patient, family, and/or caregiver, refilling medications, updating the electronic record, ordering medicines, lab work, imaging, referrals, and/or procedures as documented above and communicating with other [facility] professionals. I have discussed the results of laboratory, radiology, and cardiology studies with the patient and their family/caregiver.

## 2025-05-08 LAB
25(OH)D3+25(OH)D2 SERPL-MCNC: 27 NG/ML (ref 30–100)
ALBUMIN SERPL-MCNC: 4.9 G/DL (ref 3.6–5.1)
ALP SERPL-CCNC: 75 U/L (ref 36–130)
ALT SERPL-CCNC: 40 U/L (ref 9–46)
ANION GAP SERPL CALCULATED.4IONS-SCNC: 10 MMOL/L (CALC) (ref 7–17)
AST SERPL-CCNC: 29 U/L (ref 10–40)
BASOPHILS # BLD AUTO: 48 CELLS/UL (ref 0–200)
BASOPHILS NFR BLD AUTO: 0.7 %
BILIRUB SERPL-MCNC: 0.6 MG/DL (ref 0.2–1.2)
BUN SERPL-MCNC: 14 MG/DL (ref 7–25)
CALCIUM SERPL-MCNC: 9.3 MG/DL (ref 8.6–10.3)
CHLORIDE SERPL-SCNC: 103 MMOL/L (ref 98–110)
CHOLEST SERPL-MCNC: 151 MG/DL
CHOLEST/HDLC SERPL: 4.4 (CALC)
CO2 SERPL-SCNC: 23 MMOL/L (ref 20–32)
CREAT SERPL-MCNC: 0.93 MG/DL (ref 0.6–1.26)
EGFRCR SERPLBLD CKD-EPI 2021: 108 ML/MIN/1.73M2
EOSINOPHIL # BLD AUTO: 442 CELLS/UL (ref 15–500)
EOSINOPHIL NFR BLD AUTO: 6.4 %
ERYTHROCYTE [DISTWIDTH] IN BLOOD BY AUTOMATED COUNT: 12.8 % (ref 11–15)
EST. AVERAGE GLUCOSE BLD GHB EST-MCNC: 111 MG/DL
EST. AVERAGE GLUCOSE BLD GHB EST-SCNC: 6.2 MMOL/L
GLUCOSE SERPL-MCNC: 95 MG/DL (ref 65–99)
HBA1C MFR BLD: 5.5 %
HCT VFR BLD AUTO: 46 % (ref 38.5–50)
HDLC SERPL-MCNC: 34 MG/DL
HGB BLD-MCNC: 15.2 G/DL (ref 13.2–17.1)
LDLC SERPL CALC-MCNC: 79 MG/DL (CALC)
LYMPHOCYTES # BLD AUTO: 2111 CELLS/UL (ref 850–3900)
LYMPHOCYTES NFR BLD AUTO: 30.6 %
MCH RBC QN AUTO: 29.1 PG (ref 27–33)
MCHC RBC AUTO-ENTMCNC: 33 G/DL (ref 32–36)
MCV RBC AUTO: 88.1 FL (ref 80–100)
MONOCYTES # BLD AUTO: 566 CELLS/UL (ref 200–950)
MONOCYTES NFR BLD AUTO: 8.2 %
NEUTROPHILS # BLD AUTO: 3733 CELLS/UL (ref 1500–7800)
NEUTROPHILS NFR BLD AUTO: 54.1 %
NONHDLC SERPL-MCNC: 117 MG/DL (CALC)
PLATELET # BLD AUTO: 269 THOUSAND/UL (ref 140–400)
PMV BLD REES-ECKER: 10.3 FL (ref 7.5–12.5)
POTASSIUM SERPL-SCNC: 4.5 MMOL/L (ref 3.5–5.3)
PROT SERPL-MCNC: 7.7 G/DL (ref 6.1–8.1)
RBC # BLD AUTO: 5.22 MILLION/UL (ref 4.2–5.8)
SODIUM SERPL-SCNC: 136 MMOL/L (ref 135–146)
TRIGL SERPL-MCNC: 316 MG/DL
TSH SERPL-ACNC: 2.59 MIU/L (ref 0.4–4.5)
WBC # BLD AUTO: 6.9 THOUSAND/UL (ref 3.8–10.8)

## 2025-05-20 ENCOUNTER — TELEPHONE (OUTPATIENT)
Dept: PRIMARY CARE | Facility: CLINIC | Age: 35
End: 2025-05-20

## 2025-05-20 ENCOUNTER — APPOINTMENT (OUTPATIENT)
Dept: PRIMARY CARE | Facility: CLINIC | Age: 35
End: 2025-05-20
Payer: COMMERCIAL

## 2025-05-20 VITALS
SYSTOLIC BLOOD PRESSURE: 150 MMHG | HEART RATE: 41 BPM | HEIGHT: 72 IN | WEIGHT: 248.8 LBS | BODY MASS INDEX: 33.7 KG/M2 | DIASTOLIC BLOOD PRESSURE: 92 MMHG | TEMPERATURE: 97.5 F

## 2025-05-20 DIAGNOSIS — Z80.52 FAMILY HISTORY OF BLADDER CANCER: ICD-10-CM

## 2025-05-20 DIAGNOSIS — Z98.890 H/O AORTIC VALVULOPLASTY: ICD-10-CM

## 2025-05-20 DIAGNOSIS — R01.1 HEART MURMUR: ICD-10-CM

## 2025-05-20 DIAGNOSIS — Q23.1 CONGENITAL INSUFFICIENCY OF AORTIC VALVE (HHS-HCC): ICD-10-CM

## 2025-05-20 DIAGNOSIS — Z00.00 ENCOUNTER FOR ADULT WELLNESS VISIT: Primary | ICD-10-CM

## 2025-05-20 DIAGNOSIS — E78.2 MIXED HYPERLIPIDEMIA: ICD-10-CM

## 2025-05-20 DIAGNOSIS — Q23.81 BICUSPID AORTIC VALVE: ICD-10-CM

## 2025-05-20 DIAGNOSIS — I10 ESSENTIAL (PRIMARY) HYPERTENSION: ICD-10-CM

## 2025-05-20 PROCEDURE — 3008F BODY MASS INDEX DOCD: CPT | Performed by: STUDENT IN AN ORGANIZED HEALTH CARE EDUCATION/TRAINING PROGRAM

## 2025-05-20 PROCEDURE — 3077F SYST BP >= 140 MM HG: CPT | Performed by: STUDENT IN AN ORGANIZED HEALTH CARE EDUCATION/TRAINING PROGRAM

## 2025-05-20 PROCEDURE — 99395 PREV VISIT EST AGE 18-39: CPT | Performed by: STUDENT IN AN ORGANIZED HEALTH CARE EDUCATION/TRAINING PROGRAM

## 2025-05-20 PROCEDURE — 3080F DIAST BP >= 90 MM HG: CPT | Performed by: STUDENT IN AN ORGANIZED HEALTH CARE EDUCATION/TRAINING PROGRAM

## 2025-05-20 PROCEDURE — 1036F TOBACCO NON-USER: CPT | Performed by: STUDENT IN AN ORGANIZED HEALTH CARE EDUCATION/TRAINING PROGRAM

## 2025-05-20 ASSESSMENT — ENCOUNTER SYMPTOMS
LIGHT-HEADEDNESS: 0
ABDOMINAL PAIN: 0
CHILLS: 0
SHORTNESS OF BREATH: 0
DYSPHORIC MOOD: 0
NUMBNESS: 0
RHINORRHEA: 0
MYALGIAS: 0
PALPITATIONS: 0
CONSTIPATION: 0
COUGH: 0
NERVOUS/ANXIOUS: 0
FREQUENCY: 0
VOMITING: 0
FEVER: 0
DIARRHEA: 0
ARTHRALGIAS: 0
SORE THROAT: 0
DYSURIA: 0
COLOR CHANGE: 0
NAUSEA: 0
DIZZINESS: 0
FATIGUE: 0

## 2025-05-20 ASSESSMENT — PATIENT HEALTH QUESTIONNAIRE - PHQ9
SUM OF ALL RESPONSES TO PHQ9 QUESTIONS 1 AND 2: 0
1. LITTLE INTEREST OR PLEASURE IN DOING THINGS: NOT AT ALL
2. FEELING DOWN, DEPRESSED OR HOPELESS: NOT AT ALL

## 2025-05-20 NOTE — PATIENT INSTRUCTIONS
Thank you for coming in for your wellness examination.    We went ahead and discussed the results your blood work.  I would recommend taking a little bit of extra vitamin D about 1000 to 2000 units a day.  Please take this with a meal so that it can be properly absorbed.  Overall this level likely increased over the summer with increased sun exposure as well.    Excellent work with improving your diet and I would recommend continued adequate and increased protein intake along with plenty of vegetables and fruits.  Continue to work at cutting down on any sort of processed foods especially with your cardiac history which usually contains extra salt with decreased potassium.  Work at adding in fruits and vegetables and other foods that have increased potassium intake to also help with your blood pressures.  Also excellent work with getting back into a regular exercise routine with running.  I always recommend a combination of cardiovascular activity as well as strength training as well.    Keep up the work with adequate hydration.  I would definitely recommend cutting back on alcohol use in general as this can also cause discrepancies with minerals including both sodium and potassium.  We did discuss possibly adding on a magnesium supplement as well such as magnesium citrate during the day or magnesium glycinate at night to help with sleep.  Generally speaking you can gauge if you are taking too much magnesium especially if your stools start becoming loose and start having diarrhea.  Most of the population is deficient in magnesium and also does not get the recommended potassium intake anyway as well.  Magnesium and potassium also go hand-in-hand so I would recommend both especially with your cardiac history.    It does sound like you have seen the dermatologist previously and you can continue with regular skin checks as needed especially if you have a history of having increased sunburn.    For other discussion, I am  not aware of any specific pharmacologic interactions between something like a low-dose generic Viagra or Cialis with your other heart medication.  I would double check with your cardiologist though as well.  We did discuss the possibility of either going on something as needed versus a low-dose that would be taken daily to avoid fluctuations in blood pressure.  I would run it by them to see if they would have any sort of preference but we could also do a prescription for this in the future if needed.  I would definitely recommend watching overall stress levels and working on the other things that we outlined as well including increased physical activity as this would also benefit as well.    We can continue with yearly follow-up.    Please continue regular follow-up with your cardiologist.    Please call sooner with any other acute concerns or complaints.    Thank you

## 2025-05-20 NOTE — ASSESSMENT & PLAN NOTE
Chronic. Stable. Managed by cardiology with Dr. Mayberry. Well controlled on valsartan and nebivolol and nifedipine and has ow had added on valsartan.

## 2025-05-20 NOTE — PROGRESS NOTES
Subjective   Patient ID: Chato Ledesma is a 35 y.o. male who presents for Annual Exam.    HPI     No acute concerns or complaints today     Dental: Regular dental exams twice yearly.  Vision: Wears contacts. Yearly eye exams.    Diet: Overall improving. Working on higher protein and cutting out processed foods.  Exercise: Running 2-3 times a week over the last month.  Caffeine: less than 200 mg daily.  Fluids: Well hydrated  Supplements: Vitamin D    Tobacco: None. Never  Alcohol: 12 drinks a week  Recreational Drugs: None    Last Colonoscopy: No family history of colon or prostate cancer.  Father of bladder cancer in his 40s.  Low Dose Lung CT Screening: Not indicated.  AAA Screening: Due at age 65    Influenza: Recommended annually.  Tetanus: 07/29/2020  COVID: Recommended updated vaccine.    Review of Systems   Constitutional:  Negative for chills, fatigue and fever.   HENT:  Negative for congestion, rhinorrhea and sore throat.    Eyes:  Negative for visual disturbance.   Respiratory:  Negative for cough and shortness of breath.    Cardiovascular:  Negative for chest pain and palpitations.   Gastrointestinal:  Negative for abdominal pain, constipation, diarrhea, nausea and vomiting.   Genitourinary:  Negative for dysuria and frequency.   Musculoskeletal:  Negative for arthralgias and myalgias.   Skin:  Negative for color change and rash.   Neurological:  Negative for dizziness, light-headedness and numbness.   Psychiatric/Behavioral:  Negative for dysphoric mood. The patient is not nervous/anxious.        Objective   BP (!) 152/100   Pulse (!) 41   Temp 36.4 °C (97.5 °F)   Ht 1.829 m (6')   Wt 113 kg (248 lb 12.8 oz)   BMI 33.74 kg/m²   BSA Body surface area is 2.4 meters squared.      Physical Exam  Vitals and nursing note reviewed.   Constitutional:       General: He is not in acute distress.     Appearance: Normal appearance. He is normal weight. He is not ill-appearing or toxic-appearing.   HENT:       Head: Normocephalic and atraumatic.      Right Ear: Tympanic membrane, ear canal and external ear normal.      Left Ear: Tympanic membrane, ear canal and external ear normal.      Nose: Nose normal.      Mouth/Throat:      Mouth: Mucous membranes are moist.      Pharynx: Oropharynx is clear.   Eyes:      Extraocular Movements: Extraocular movements intact.      Conjunctiva/sclera: Conjunctivae normal.      Pupils: Pupils are equal, round, and reactive to light.   Cardiovascular:      Rate and Rhythm: Normal rate and regular rhythm.      Pulses: Normal pulses.      Heart sounds: Normal heart sounds.   Pulmonary:      Effort: Pulmonary effort is normal.      Breath sounds: Normal breath sounds.   Abdominal:      General: Abdomen is flat. Bowel sounds are normal.      Palpations: Abdomen is soft.   Musculoskeletal:         General: Normal range of motion.      Cervical back: Normal range of motion and neck supple.   Skin:     General: Skin is warm and dry.   Neurological:      General: No focal deficit present.      Mental Status: He is alert and oriented to person, place, and time. Mental status is at baseline.      Cranial Nerves: No cranial nerve deficit.      Sensory: No sensory deficit.      Motor: No weakness.   Psychiatric:         Mood and Affect: Mood normal.         Behavior: Behavior normal.         Thought Content: Thought content normal.         Judgment: Judgment normal.       Orders Only on 05/07/2025   Component Date Value Ref Range Status    CHOLESTEROL, TOTAL 05/07/2025 151  <200 mg/dL Final    HDL CHOLESTEROL 05/07/2025 34 (L)  > OR = 40 mg/dL Final    TRIGLYCERIDES 05/07/2025 316 (H)  <150 mg/dL Final    Comment:    If a non-fasting specimen was collected, consider  repeat triglyceride testing on a fasting specimen  if clinically indicated.   Gamal et al. J. of Clin. Lipidol. 2015;9:129-169.         LDL-CHOLESTEROL 05/07/2025 79  mg/dL (calc) Final    Comment: Reference range: <100     Desirable  range <100 mg/dL for primary prevention;    <70 mg/dL for patients with CHD or diabetic patients   with > or = 2 CHD risk factors.     LDL-C is now calculated using the Yasir   calculation, which is a validated novel method providing   better accuracy than the Friedewald equation in the   estimation of LDL-C.   Maicol BEARDEN et al. SALLIE. 2013;310(19): 5016-1762   (http://[facility].[employer]/faq/WGA390)      CHOL/HDLC RATIO 05/07/2025 4.4  <5.0 (calc) Final    NON HDL CHOLESTEROL 05/07/2025 117  <130 mg/dL (calc) Final    Comment: For patients with diabetes plus 1 major ASCVD risk   factor, treating to a non-HDL-C goal of <100 mg/dL   (LDL-C of <70 mg/dL) is considered a therapeutic   option.      GLUCOSE 05/07/2025 95  65 - 99 mg/dL Final    Comment:               Fasting reference interval         UREA NITROGEN (BUN) 05/07/2025 14  7 - 25 mg/dL Final    CREATININE 05/07/2025 0.93  0.60 - 1.26 mg/dL Final    EGFR 05/07/2025 108  > OR = 60 mL/min/1.73m2 Final    SODIUM 05/07/2025 136  135 - 146 mmol/L Final    POTASSIUM 05/07/2025 4.5  3.5 - 5.3 mmol/L Final    CHLORIDE 05/07/2025 103  98 - 110 mmol/L Final    CARBON DIOXIDE 05/07/2025 23  20 - 32 mmol/L Final    ELECTROLYTE BALANCE 05/07/2025 10  7 - 17 mmol/L (calc) Final    CALCIUM 05/07/2025 9.3  8.6 - 10.3 mg/dL Final    PROTEIN, TOTAL 05/07/2025 7.7  6.1 - 8.1 g/dL Final    ALBUMIN 05/07/2025 4.9  3.6 - 5.1 g/dL Final    BILIRUBIN, TOTAL 05/07/2025 0.6  0.2 - 1.2 mg/dL Final    ALKALINE PHOSPHATASE 05/07/2025 75  36 - 130 U/L Final    AST 05/07/2025 29  10 - 40 U/L Final    ALT 05/07/2025 40  9 - 46 U/L Final    WHITE BLOOD CELL COUNT 05/07/2025 6.9  3.8 - 10.8 Thousand/uL Final    RED BLOOD CELL COUNT 05/07/2025 5.22  4.20 - 5.80 Million/uL Final    HEMOGLOBIN 05/07/2025 15.2  13.2 - 17.1 g/dL Final    HEMATOCRIT 05/07/2025 46.0  38.5 - 50.0 % Final    MCV 05/07/2025 88.1  80.0 - 100.0 fL Final    MCH 05/07/2025 29.1  27.0 - 33.0 pg Final     MCHC 05/07/2025 33.0  32.0 - 36.0 g/dL Final    Comment: For adults, a slight decrease in the calculated MCHC  value (in the range of 30 to 32 g/dL) is most likely  not clinically significant; however, it should be  interpreted with caution in correlation with other  red cell parameters and the patient's clinical  condition.      RDW 05/07/2025 12.8  11.0 - 15.0 % Final    PLATELET COUNT 05/07/2025 269  140 - 400 Thousand/uL Final    MPV 05/07/2025 10.3  7.5 - 12.5 fL Final    ABSOLUTE NEUTROPHILS 05/07/2025 3,733  1,500 - 7,800 cells/uL Final    ABSOLUTE LYMPHOCYTES 05/07/2025 2,111  850 - 3,900 cells/uL Final    ABSOLUTE MONOCYTES 05/07/2025 566  200 - 950 cells/uL Final    ABSOLUTE EOSINOPHILS 05/07/2025 442  15 - 500 cells/uL Final    ABSOLUTE BASOPHILS 05/07/2025 48  0 - 200 cells/uL Final    NEUTROPHILS 05/07/2025 54.1  % Final    LYMPHOCYTES 05/07/2025 30.6  % Final    MONOCYTES 05/07/2025 8.2  % Final    EOSINOPHILS 05/07/2025 6.4  % Final    BASOPHILS 05/07/2025 0.7  % Final    TSH W/REFLEX TO FT4 05/07/2025 2.59  0.40 - 4.50 mIU/L Final    VITAMIN D,25-OH,TOTAL,IA 05/07/2025 27 (L)  30 - 100 ng/mL Final    Comment: Vitamin D Status         25-OH Vitamin D:     Deficiency:                    <20 ng/mL  Insufficiency:             20 - 29 ng/mL  Optimal:                 > or = 30 ng/mL     For 25-OH Vitamin D testing on patients on   D2-supplementation and patients for whom quantitation   of D2 and D3 fractions is required, the QuestAssureD()  25-OH VIT D, (D2,D3), LC/MS/MS is recommended: order   code 56982 (patients >2yrs).     See Note 1     Note 1     For additional information, please refer to   http://education.[employer]/faq/CAS277   (This link is being provided for informational/  educational purposes only.)      HEMOGLOBIN A1c 05/07/2025 5.5  <5.7 % Final    Comment: For the purpose of screening for the presence of  diabetes:     <5.7%       Consistent with the absence of  diabetes  5.7-6.4%    Consistent with increased risk for diabetes              (prediabetes)  > or =6.5%  Consistent with diabetes     This assay result is consistent with a decreased risk  of diabetes.     Currently, no consensus exists regarding use of  hemoglobin A1c for diagnosis of diabetes in children.     According to American Diabetes Association (ADA)  guidelines, hemoglobin A1c <7.0% represents optimal  control in non-pregnant diabetic patients. Different  metrics may apply to specific patient populations.   Standards of Medical Care in Diabetes(ADA).          eAG (mg/dL) 05/07/2025 111  mg/dL Final    eAG (mmol/L) 05/07/2025 6.2  mmol/L Final   Hospital Outpatient Visit on 04/29/2025   Component Date Value Ref Range Status    AV pk gurinder 04/29/2025 2.45  m/s Final    AV mn grad 04/29/2025 12  mmHg Final    LVOT diam 04/29/2025 3.15  cm Final    MV E/A ratio 04/29/2025 1.38   Final    LA vol index A/L 04/29/2025 32.1  ml/m2 Final    Tricuspid annular plane systolic e* 04/29/2025 2.4  cm Final    LV EF 04/29/2025 66  % Final    RV free wall pk S' 04/29/2025 13.00  cm/s Final    LVIDd 04/29/2025 5.68  cm Final    Aortic Valve Area by Continuity of* 04/29/2025 3.32  cm2 Final    Aortic Valve Area by Continuity of* 04/29/2025 2.82  cm2 Final    AV pk grad 04/29/2025 24  mmHg Final    LV A4C EF 04/29/2025 74.1   Final     Medications Ordered Prior to Encounter[1]  No images are attached to the encounter.        Assessment/Plan   Problem List Items Addressed This Visit           ICD-10-CM    Congenital insufficiency of aortic valve (HHS-HCC) Q23.1    Discovered in 2015 with heart murmur. Was seen by cardiology and found to be stable on echocardiogram. Repeated a year later and found to be grade 4 regurgitation. Aortic valvuloplasty performed in 2017.         H/O aortic valvuloplasty Z98.890    Performed April 20th 2017. Follows with cardiology with  with Dr. Mayberry.         Bicuspid aortic valve Q23.81     Discovered in 2015 with heart murmur. Was seen by cardiology and found to be stable on echocardiogram. Repeated a year later and found to be grade 4 regurgitation. Aortic valvuloplasty performed in 2017.         Essential (primary) hypertension I10    Chronic. Stable. Managed by cardiology with Dr. Mayberry. Well controlled on valsartan and nebivolol and nifedipine and has ow had added on valsartan.         Hyperlipidemia E78.5    Chronic. Stable. Well controlled on the atorvastatin.         Heart murmur R01.1    Discovered in 2015 with heart murmur. Was seen by cardiology and found to be stable on echocardiogram. Repeated a year later and found to be grade 4 regurgitation. Aortic valvuloplasty performed in 2017.         Family history of bladder cancer Z80.52    Father diagnosed in his 40s          Other Visit Diagnoses         Codes      Encounter for adult wellness visit    -  Primary Z00.00          History and physical examination as above.  Patient coming in for wellness examination.  No acute concerns or complaints today.  Reviewed results of blood work.  Recommended some additional vitamin D with supplementation in about 1000 units to 2000 units a day.  Discussed multiple aspects of health including healthy diet and exercise, regular dental and vision care, caffeine use, hydration, supplementation, substance use, screening test and immunizations.  Recommendations given as documented.  Encourage patient to continue with regular follow-up with cardiology primarily for his heart medications as well as his blood pressures.  Encouraged to keep regular track of his blood pressures at home.  We did discuss possible role of treatment for erectile dysfunction if needed and possible interactions with heart medications and he will follow-up with cardiology if he ever reaches this point.  Did discuss some additional mineral supplementation primarily through food sources that he can also work on that may also be of benefit  for the blood pressure and overall heart health.  We will continue with yearly follow-up.  He will follow-up sooner with any other acute concerns or complaints.          [1]   Current Outpatient Medications on File Prior to Visit   Medication Sig Dispense Refill    aspirin 81 mg EC tablet Take 1 tablet (81 mg) by mouth once daily.      atorvastatin (Lipitor) 40 mg tablet TAKE 1 TABLET BY MOUTH ONCE  DAILY 90 tablet 1    nebivolol (Bystolic) 5 mg tablet TAKE 1 TABLET BY MOUTH DAILY 90 tablet 0    NIFEdipine XL 90 mg 24 hr tablet TAKE 1 TABLET BY MOUTH DAILY 90 tablet 0    valsartan (Diovan) 160 mg tablet Take 1 tablet (160 mg) by mouth once daily. 30 tablet 11    amoxicillin (Amoxil) 500 mg capsule  (Patient not taking: Reported on 5/20/2025)      cholecalciferol (Vitamin D-3) 5,000 Units tablet Take by mouth. (Patient not taking: Reported on 5/20/2025)       No current facility-administered medications on file prior to visit.

## 2025-06-09 DIAGNOSIS — E78.5 HYPERLIPIDEMIA, UNSPECIFIED HYPERLIPIDEMIA TYPE: ICD-10-CM

## 2025-06-10 RX ORDER — ATORVASTATIN CALCIUM 40 MG/1
40 TABLET, FILM COATED ORAL DAILY
Qty: 90 TABLET | Refills: 1 | Status: SHIPPED | OUTPATIENT
Start: 2025-06-10

## 2025-06-21 DIAGNOSIS — I10 ESSENTIAL (PRIMARY) HYPERTENSION: ICD-10-CM

## 2025-06-23 RX ORDER — NIFEDIPINE 90 MG/1
90 TABLET, EXTENDED RELEASE ORAL DAILY
Qty: 90 TABLET | Refills: 3 | Status: SHIPPED | OUTPATIENT
Start: 2025-06-23

## 2025-06-23 RX ORDER — NEBIVOLOL 5 MG/1
5 TABLET ORAL DAILY
Qty: 90 TABLET | Refills: 3 | Status: SHIPPED | OUTPATIENT
Start: 2025-06-23

## 2025-07-23 DIAGNOSIS — E78.2 MIXED HYPERLIPIDEMIA: ICD-10-CM

## 2025-07-23 DIAGNOSIS — Z98.890 STATUS POST AORTIC VALVE REPAIR: ICD-10-CM

## 2025-07-23 DIAGNOSIS — I10 ESSENTIAL (PRIMARY) HYPERTENSION: ICD-10-CM

## 2025-07-23 RX ORDER — VALSARTAN 160 MG/1
160 TABLET ORAL DAILY
Qty: 90 TABLET | Refills: 3 | Status: SHIPPED | OUTPATIENT
Start: 2025-07-23 | End: 2026-07-23

## 2026-05-21 ENCOUNTER — APPOINTMENT (OUTPATIENT)
Dept: PRIMARY CARE | Facility: CLINIC | Age: 36
End: 2026-05-21
Payer: COMMERCIAL